# Patient Record
Sex: FEMALE | Race: WHITE | Employment: FULL TIME | URBAN - METROPOLITAN AREA
[De-identification: names, ages, dates, MRNs, and addresses within clinical notes are randomized per-mention and may not be internally consistent; named-entity substitution may affect disease eponyms.]

---

## 2023-09-12 ENCOUNTER — HOSPITAL ENCOUNTER (OUTPATIENT)
Age: 38
Discharge: HOME | End: 2023-09-12
Payer: COMMERCIAL

## 2023-09-12 LAB
ALBUMIN SERPL-MCNC: 4.4 G/DL (ref 3.5–5.2)
ALBUMIN SERPL-MCNC: 4.5 G/DL (ref 3.5–5.2)
ALBUMIN/GLOB SERPL: 1.3 {RATIO} (ref 1–2.5)
ALBUMIN/GLOB SERPL: 1.5 {RATIO} (ref 1–2.5)
ALP SERPL-CCNC: 99 U/L (ref 35–104)
ALP SERPL-CCNC: 99 U/L (ref 35–104)
ALT SERPL-CCNC: 67 U/L (ref 5–33)
ALT SERPL-CCNC: 68 U/L (ref 5–33)
ANION GAP SERPL CALCULATED.3IONS-SCNC: 13 MMOL/L (ref 9–17)
ANION GAP SERPL CALCULATED.3IONS-SCNC: 13 MMOL/L (ref 9–17)
AST SERPL-CCNC: 74 U/L
AST SERPL-CCNC: 75 U/L
BASOPHILS # BLD: 0.08 K/UL (ref 0–0.2)
BASOPHILS # BLD: 0.09 K/UL (ref 0–0.2)
BASOPHILS NFR BLD: 1 % (ref 0–2)
BASOPHILS NFR BLD: 1 % (ref 0–2)
BILIRUB SERPL-MCNC: 0.8 MG/DL (ref 0.3–1.2)
BILIRUB SERPL-MCNC: 0.8 MG/DL (ref 0.3–1.2)
BUN SERPL-MCNC: 7 MG/DL (ref 6–20)
BUN SERPL-MCNC: 7 MG/DL (ref 6–20)
CALCIUM SERPL-MCNC: 9.5 MG/DL (ref 8.6–10.4)
CALCIUM SERPL-MCNC: 9.6 MG/DL (ref 8.6–10.4)
CHLORIDE SERPL-SCNC: 100 MMOL/L (ref 98–107)
CHLORIDE SERPL-SCNC: 101 MMOL/L (ref 98–107)
CHOLEST SERPL-MCNC: 196 MG/DL
CHOLESTEROL/HDL RATIO: 2.5
CO2 SERPL-SCNC: 25 MMOL/L (ref 20–31)
CO2 SERPL-SCNC: 25 MMOL/L (ref 20–31)
CREAT SERPL-MCNC: 0.6 MG/DL (ref 0.5–0.9)
CREAT SERPL-MCNC: 0.6 MG/DL (ref 0.5–0.9)
CRP SERPL HS-MCNC: <3 MG/L (ref 0–5)
EOSINOPHIL # BLD: 0.28 K/UL (ref 0–0.44)
EOSINOPHIL # BLD: 0.29 K/UL (ref 0–0.44)
EOSINOPHILS RELATIVE PERCENT: 4 % (ref 1–4)
EOSINOPHILS RELATIVE PERCENT: 4 % (ref 1–4)
ERYTHROCYTE [DISTWIDTH] IN BLOOD BY AUTOMATED COUNT: 12 % (ref 11.8–14.4)
ERYTHROCYTE [DISTWIDTH] IN BLOOD BY AUTOMATED COUNT: 12.1 % (ref 11.8–14.4)
ERYTHROCYTE [SEDIMENTATION RATE] IN BLOOD BY PHOTOMETRIC METHOD: 5 MM/HR (ref 0–20)
GFR SERPL CREATININE-BSD FRML MDRD: >60 ML/MIN/1.73M2
GFR SERPL CREATININE-BSD FRML MDRD: >60 ML/MIN/1.73M2
GLUCOSE SERPL-MCNC: 80 MG/DL (ref 70–99)
GLUCOSE SERPL-MCNC: 82 MG/DL (ref 70–99)
HCT VFR BLD AUTO: 44.4 % (ref 36.3–47.1)
HCT VFR BLD AUTO: 44.7 % (ref 36.3–47.1)
HDLC SERPL-MCNC: 77 MG/DL
HGB BLD-MCNC: 14.8 G/DL (ref 11.9–15.1)
HGB BLD-MCNC: 15 G/DL (ref 11.9–15.1)
IMM GRANULOCYTES # BLD AUTO: 0.03 K/UL (ref 0–0.3)
IMM GRANULOCYTES # BLD AUTO: <0.03 K/UL (ref 0–0.3)
IMM GRANULOCYTES NFR BLD: 0 %
IMM GRANULOCYTES NFR BLD: 0 %
LDLC SERPL CALC-MCNC: 108 MG/DL (ref 0–130)
LYMPHOCYTES NFR BLD: 1.35 K/UL (ref 1.1–3.7)
LYMPHOCYTES NFR BLD: 1.41 K/UL (ref 1.1–3.7)
LYMPHOCYTES RELATIVE PERCENT: 17 % (ref 24–43)
LYMPHOCYTES RELATIVE PERCENT: 18 % (ref 24–43)
MCH RBC QN AUTO: 35.7 PG (ref 25.2–33.5)
MCH RBC QN AUTO: 36.3 PG (ref 25.2–33.5)
MCHC RBC AUTO-ENTMCNC: 33.1 G/DL (ref 28.4–34.8)
MCHC RBC AUTO-ENTMCNC: 33.8 G/DL (ref 28.4–34.8)
MCV RBC AUTO: 107.5 FL (ref 82.6–102.9)
MCV RBC AUTO: 108 FL (ref 82.6–102.9)
MONOCYTES NFR BLD: 0.87 K/UL (ref 0.1–1.2)
MONOCYTES NFR BLD: 0.9 K/UL (ref 0.1–1.2)
MONOCYTES NFR BLD: 11 % (ref 3–12)
MONOCYTES NFR BLD: 11 % (ref 3–12)
NEUTROPHILS NFR BLD: 66 % (ref 36–65)
NEUTROPHILS NFR BLD: 67 % (ref 36–65)
NEUTS SEG NFR BLD: 5.25 K/UL (ref 1.5–8.1)
NEUTS SEG NFR BLD: 5.32 K/UL (ref 1.5–8.1)
NRBC BLD-RTO: 0 PER 100 WBC
NRBC BLD-RTO: 0 PER 100 WBC
PLATELET # BLD AUTO: 388 K/UL (ref 138–453)
PLATELET # BLD AUTO: 402 K/UL (ref 138–453)
PMV BLD AUTO: 10.9 FL (ref 8.1–13.5)
PMV BLD AUTO: 10.9 FL (ref 8.1–13.5)
POTASSIUM SERPL-SCNC: 4.2 MMOL/L (ref 3.7–5.3)
POTASSIUM SERPL-SCNC: 4.2 MMOL/L (ref 3.7–5.3)
PROLACTIN SERPL-MCNC: 7 NG/ML (ref 4.79–23.3)
PROT SERPL-MCNC: 7.5 G/DL (ref 6.4–8.3)
PROT SERPL-MCNC: 7.8 G/DL (ref 6.4–8.3)
RBC # BLD AUTO: 4.13 M/UL (ref 3.95–5.11)
RBC # BLD AUTO: 4.14 M/UL (ref 3.95–5.11)
RBC # BLD: ABNORMAL 10*6/UL
RBC # BLD: ABNORMAL 10*6/UL
SODIUM SERPL-SCNC: 138 MMOL/L (ref 135–144)
SODIUM SERPL-SCNC: 139 MMOL/L (ref 135–144)
TRIGL SERPL-MCNC: 54 MG/DL
TSH SERPL DL<=0.05 MIU/L-ACNC: 2.34 UIU/ML (ref 0.3–5)
WBC OTHER # BLD: 7.9 K/UL (ref 3.5–11.3)
WBC OTHER # BLD: 8 K/UL (ref 3.5–11.3)

## 2023-09-12 PROCEDURE — 36415 COLL VENOUS BLD VENIPUNCTURE: CPT

## 2023-09-12 PROCEDURE — 80053 COMPREHEN METABOLIC PANEL: CPT

## 2023-09-12 PROCEDURE — 85652 RBC SED RATE AUTOMATED: CPT

## 2023-09-12 PROCEDURE — 83036 HEMOGLOBIN GLYCOSYLATED A1C: CPT

## 2023-09-12 PROCEDURE — 84443 ASSAY THYROID STIM HORMONE: CPT

## 2023-09-12 PROCEDURE — 86140 C-REACTIVE PROTEIN: CPT

## 2023-09-12 PROCEDURE — 84146 ASSAY OF PROLACTIN: CPT

## 2023-09-12 PROCEDURE — 82670 ASSAY OF TOTAL ESTRADIOL: CPT

## 2023-09-12 PROCEDURE — 82306 VITAMIN D 25 HYDROXY: CPT

## 2023-09-12 PROCEDURE — 80061 LIPID PANEL: CPT

## 2023-09-12 PROCEDURE — 85025 COMPLETE CBC W/AUTO DIFF WBC: CPT

## 2023-09-12 PROCEDURE — 83001 ASSAY OF GONADOTROPIN (FSH): CPT

## 2023-09-13 ENCOUNTER — HOSPITAL ENCOUNTER (OUTPATIENT)
Age: 38
Setting detail: SPECIMEN
Discharge: HOME | End: 2023-09-13
Payer: COMMERCIAL

## 2023-09-13 LAB
25(OH)D3 SERPL-MCNC: 42.9 NG/ML
EST. AVERAGE GLUCOSE BLD GHB EST-MCNC: 85 MG/DL
ESTRADIOL LEVEL: <5 PG/ML
FSH SERPL-ACNC: 23.8 MIU/ML
HBA1C MFR BLD: 4.6 % (ref 4–6)

## 2023-09-13 PROCEDURE — 83993 ASSAY FOR CALPROTECTIN FECAL: CPT

## 2023-09-16 LAB — CALPROTECTIN, FECAL: 2080 UG/G

## 2023-10-09 ENCOUNTER — HOSPITAL ENCOUNTER (OUTPATIENT)
Dept: MAMMOGRAPHY | Age: 38
Discharge: HOME | End: 2023-10-11

## 2023-10-09 ENCOUNTER — HOSPITAL ENCOUNTER (OUTPATIENT)
Dept: ULTRASOUND IMAGING | Age: 38
Discharge: HOME | End: 2023-10-11

## 2023-10-09 DIAGNOSIS — N63.11 UNSPECIFIED LUMP IN THE RIGHT BREAST, UPPER OUTER QUADRANT: ICD-10-CM

## 2024-10-31 ENCOUNTER — HOSPITAL ENCOUNTER (EMERGENCY)
Age: 39
Discharge: HOME | End: 2024-10-31
Attending: EMERGENCY MEDICINE
Payer: COMMERCIAL

## 2024-10-31 VITALS
OXYGEN SATURATION: 100 % | HEART RATE: 88 BPM | BODY MASS INDEX: 23.08 KG/M2 | SYSTOLIC BLOOD PRESSURE: 118 MMHG | TEMPERATURE: 98.4 F | DIASTOLIC BLOOD PRESSURE: 60 MMHG | WEIGHT: 107 LBS | HEIGHT: 57 IN | RESPIRATION RATE: 16 BRPM

## 2024-10-31 DIAGNOSIS — S61.211A LACERATION OF LEFT INDEX FINGER WITHOUT DAMAGE TO NAIL, FOREIGN BODY PRESENCE UNSPECIFIED, INITIAL ENCOUNTER: Primary | ICD-10-CM

## 2024-10-31 PROCEDURE — 6360000002 HC RX W HCPCS: Performed by: NURSE PRACTITIONER

## 2024-10-31 PROCEDURE — 90471 IMMUNIZATION ADMIN: CPT | Performed by: NURSE PRACTITIONER

## 2024-10-31 PROCEDURE — 12001 RPR S/N/AX/GEN/TRNK 2.5CM/<: CPT

## 2024-10-31 PROCEDURE — 99284 EMERGENCY DEPT VISIT MOD MDM: CPT

## 2024-10-31 PROCEDURE — 90715 TDAP VACCINE 7 YRS/> IM: CPT | Performed by: NURSE PRACTITIONER

## 2024-10-31 RX ORDER — ESCITALOPRAM OXALATE 10 MG/1
10 TABLET ORAL DAILY
COMMUNITY
Start: 2024-10-17

## 2024-10-31 RX ADMIN — TETANUS TOXOID, REDUCED DIPHTHERIA TOXOID AND ACELLULAR PERTUSSIS VACCINE, ADSORBED 0.5 ML: 5; 2.5; 8; 8; 2.5 SUSPENSION INTRAMUSCULAR at 12:11

## 2024-10-31 ASSESSMENT — PAIN SCALES - GENERAL: PAINLEVEL_OUTOF10: 4

## 2024-10-31 ASSESSMENT — ENCOUNTER SYMPTOMS
ABDOMINAL PAIN: 0
SORE THROAT: 0
COUGH: 0
VOMITING: 0
BACK PAIN: 0
DIARRHEA: 0
NAUSEA: 0
SHORTNESS OF BREATH: 0

## 2024-10-31 ASSESSMENT — LIFESTYLE VARIABLES
HOW OFTEN DO YOU HAVE A DRINK CONTAINING ALCOHOL: 2-3 TIMES A WEEK
HOW MANY STANDARD DRINKS CONTAINING ALCOHOL DO YOU HAVE ON A TYPICAL DAY: 1 OR 2

## 2024-10-31 ASSESSMENT — PAIN - FUNCTIONAL ASSESSMENT: PAIN_FUNCTIONAL_ASSESSMENT: 0-10

## 2024-10-31 NOTE — ED PROVIDER NOTES
[facility] Emergency Department  [address]  [facility] [address]  Phone: [phone]  Fax: [phone]      Attending Physician Attestation    Based on the medical record, the care appears appropriate. I was personally available for consultation in the Emergency Department. I did have a discussion with our midlevel provider regarding the care of this patient.  I reviewed the mid level provider's note and agree with the documented findings and plan of care.   I have reviewed the emergency nurses triage note. I agree with the chief complaint, past medical history, past surgical history, allergies, medications, social and family history as documented unless otherwise noted below.       CHIEF COMPLAINT       Chief Complaint   Patient presents with    Laceration     L index finger on the pad with a dermaplane razor, bleeding controlled, needs tetanus, no thinners         PAST MEDICAL HISTORY    has no past medical history on file.    SURGICAL HISTORY      has no past surgical history on file.    CURRENT MEDICATIONS       Previous Medications    ALBUTEROL (PROVENTIL;VENTOLIN) 2 MG/5ML SYRUP    Take 5 mLs by mouth 3 times daily    CETIRIZINE (ZYRTEC) 5 MG TABLET    Take 1 tablet by mouth daily    ESCITALOPRAM (LEXAPRO) 10 MG TABLET    Take 1 tablet by mouth daily    HYDROCODONE-ACETAMINOPHEN (NORCO) 5-325 MG PER TABLET    Take 1 tablet by mouth every 6 hours as needed for Pain. Max Daily Amount: 4 tablets    MESALAMINE (LIALDA) 1.2 G EC TABLET    Take 1 tablet by mouth daily (with breakfast)    PANTOPRAZOLE (PROTONIX) 20 MG TABLET    Take 1 tablet by mouth daily       ALLERGIES     is allergic to adhesive tape.    (Please note that portions of this note were completed with a voice recognition program.  Efforts were made to edit the dictations but occasionally words are mis-transcribed.)    Bean Vázquez DO, DO  Attending Emergency Physician        Bean Vázquez DO  10/31/24  [id number]

## 2024-10-31 NOTE — ED PROVIDER NOTES
[facility] EMERGENCY DEPARTMENT  EMERGENCY DEPARTMENT ENCOUNTER      Pt Name: Rand Cardenas  MRN: [id number]  Birthdate 1985  Date of evaluation: 10/31/2024  Provider: IKE Dempsey CNP  12:24 PM    CHIEF COMPLAINT       Chief Complaint   Patient presents with    Laceration     L index finger on the pad with a dermaplane razor, bleeding controlled, needs tetanus, no thinners         HISTORY OF PRESENT ILLNESS    Rand Cardenas is a 38 y.o. female who presents to the emergency department      This is a nontoxic-appearing 38-year-old female presenting to the emergency department with her  the patient reports approximately 1 hour prior to arrival with her micro derma blade she accidentally cut the tip of her left hand second digit, was concerned that it may need repair prompting her to come to the emergency department.  Patient reports that her tetanus immunization is not up-to-date, she has washed the wound and put a dressing over it.        The history is provided by the patient and medical records.       Nursing Notes were reviewed.    REVIEW OF SYSTEMS       Review of Systems   Constitutional:  Negative for chills, fatigue and fever.   HENT:  Negative for congestion and sore throat.    Respiratory:  Negative for cough and shortness of breath.    Cardiovascular:  Negative for chest pain and leg swelling.   Gastrointestinal:  Negative for abdominal pain, diarrhea, nausea and vomiting.   Genitourinary:  Negative for dysuria and hematuria.   Musculoskeletal:  Negative for back pain and neck pain.   Skin:  Negative for rash and wound.        Positive for left hand second distal digit skin laceration.   Neurological:  Negative for numbness and headaches.   Psychiatric/Behavioral:  Negative for confusion and suicidal ideas.        Except as noted above the remainder of the review of systems was reviewed and negative.       PAST MEDICAL HISTORY   History reviewed. No pertinent past  present.      Mental Status: She is alert and oriented to person, place, and time. Mental status is at baseline.      Gait: Gait normal.   Psychiatric:         Mood and Affect: Mood normal.         Behavior: Behavior normal.         DIAGNOSTIC RESULTS     EKG: All EKG's are interpreted by the Emergency Department Physician who either signs or Co-signs this chart in the absence of a cardiologist.        RADIOLOGY:   Non-plain film images such as CT, Ultrasound and MRI are read by the radiologist. Plain radiographic images are visualized and preliminarily interpreted by the emergency physician with the below findings:        Interpretation per the Radiologist below, if available at the time of this note:    No orders to display         ED BEDSIDE ULTRASOUND:   Performed by ED Physician - none    LABS:  Labs Reviewed - No data to display    All other labs were within normal range or not returned as of this dictation.    EMERGENCY DEPARTMENT COURSE and DIFFERENTIAL DIAGNOSIS/MDM:   Vitals:    Vitals:    10/31/24 1113   BP: 118/60   Pulse: 88   Resp: 16   Temp: 98.4 °F (36.9 °C)   TempSrc: Oral   SpO2: 100%   Weight: 48.5 kg (107 lb)   Height: 1.448 m (4' 9\")           Medical Decision Making  This is a nontoxic-appearing 38-year-old female presenting to the emergency department with her  the patient reports approximately 1 hour prior to arrival with her micro derma blade she accidentally cut the tip of her left hand second digit, was concerned that it may need repair prompting her to come to the emergency department.  Patient reports that her tetanus immunization is not up-to-date, she has washed the wound and put a dressing over it.    Patient's tetanus immunization was updated in the department.    Wound was soaked and cleansed and Shur-Clens, warm water.    Laceration was repaired without complication with skin adhesive patient tolerated this well, on exam there is no concerns for tendon involvement or damage,

## 2024-10-31 NOTE — DISCHARGE INSTRUCTIONS
Return to the ER: Fevers, redness warmth swelling to the finger, worsening pain, pale color or numbness, weakness, streaks of red going up the finger or hand or arm; or any other concerning symptoms.

## 2024-11-02 ENCOUNTER — HOSPITAL ENCOUNTER (EMERGENCY)
Age: 39
Discharge: HOME | End: 2024-11-02
Attending: EMERGENCY MEDICINE
Payer: COMMERCIAL

## 2024-11-02 ENCOUNTER — APPOINTMENT (OUTPATIENT)
Dept: CT IMAGING | Age: 39
End: 2024-11-02
Payer: COMMERCIAL

## 2024-11-02 VITALS
WEIGHT: 112 LBS | BODY MASS INDEX: 24.16 KG/M2 | OXYGEN SATURATION: 99 % | HEIGHT: 57 IN | SYSTOLIC BLOOD PRESSURE: 141 MMHG | RESPIRATION RATE: 20 BRPM | HEART RATE: 81 BPM | TEMPERATURE: 97.7 F | DIASTOLIC BLOOD PRESSURE: 76 MMHG

## 2024-11-02 DIAGNOSIS — R10.9 ABDOMINAL PAIN, UNSPECIFIED ABDOMINAL LOCATION: Primary | ICD-10-CM

## 2024-11-02 DIAGNOSIS — R11.2 NAUSEA AND VOMITING, UNSPECIFIED VOMITING TYPE: ICD-10-CM

## 2024-11-02 LAB
ALBUMIN SERPL-MCNC: 5 G/DL (ref 3.5–5.2)
ALBUMIN/GLOB SERPL: 1.3 {RATIO} (ref 1–2.5)
ALP SERPL-CCNC: 83 U/L (ref 35–104)
ALT SERPL-CCNC: 23 U/L (ref 5–33)
ANION GAP SERPL CALCULATED.3IONS-SCNC: 17 MMOL/L (ref 9–17)
AST SERPL-CCNC: 23 U/L
BASOPHILS # BLD: 0.1 K/UL (ref 0–0.2)
BASOPHILS NFR BLD: 1 % (ref 0–2)
BILIRUB SERPL-MCNC: 1.3 MG/DL (ref 0.3–1.2)
BUN SERPL-MCNC: 12 MG/DL (ref 6–20)
CALCIUM SERPL-MCNC: 10.4 MG/DL (ref 8.6–10.4)
CHLORIDE SERPL-SCNC: 97 MMOL/L (ref 98–107)
CO2 SERPL-SCNC: 21 MMOL/L (ref 20–31)
CREAT SERPL-MCNC: 0.5 MG/DL (ref 0.5–0.9)
EOSINOPHIL # BLD: 0 K/UL (ref 0–0.4)
EOSINOPHILS RELATIVE PERCENT: 0 % (ref 1–4)
ERYTHROCYTE [DISTWIDTH] IN BLOOD BY AUTOMATED COUNT: 12.2 % (ref 12.5–15.4)
GFR, ESTIMATED: >90 ML/MIN/1.73M2
GLUCOSE SERPL-MCNC: 99 MG/DL (ref 70–99)
HCG SERPL QL: NEGATIVE
HCT VFR BLD AUTO: 45.7 % (ref 36–46)
HGB BLD-MCNC: 15.6 G/DL (ref 12–16)
LACTATE BLDV-SCNC: 2.3 MMOL/L (ref 0.5–2.2)
LIPASE SERPL-CCNC: 17 U/L (ref 13–60)
LYMPHOCYTES NFR BLD: 1.1 K/UL (ref 1–4.8)
LYMPHOCYTES RELATIVE PERCENT: 9 % (ref 24–44)
MCH RBC QN AUTO: 34.4 PG (ref 26–34)
MCHC RBC AUTO-ENTMCNC: 34.1 G/DL (ref 31–37)
MCV RBC AUTO: 100.8 FL (ref 80–100)
MONOCYTES NFR BLD: 0.9 K/UL (ref 0.1–1.2)
MONOCYTES NFR BLD: 7 % (ref 2–11)
NEUTROPHILS NFR BLD: 83 % (ref 36–66)
NEUTS SEG NFR BLD: 10.2 K/UL (ref 1.8–7.7)
PLATELET # BLD AUTO: 493 K/UL (ref 140–450)
PMV BLD AUTO: 8.6 FL (ref 6–12)
POTASSIUM SERPL-SCNC: 4.3 MMOL/L (ref 3.7–5.3)
PROT SERPL-MCNC: 9 G/DL (ref 6.4–8.3)
RBC # BLD AUTO: 4.54 M/UL (ref 4–5.2)
SODIUM SERPL-SCNC: 135 MMOL/L (ref 135–144)
WBC OTHER # BLD: 12.2 K/UL (ref 3.5–11)

## 2024-11-02 PROCEDURE — 96372 THER/PROPH/DIAG INJ SC/IM: CPT | Performed by: EMERGENCY MEDICINE

## 2024-11-02 PROCEDURE — 83690 ASSAY OF LIPASE: CPT

## 2024-11-02 PROCEDURE — 80053 COMPREHEN METABOLIC PANEL: CPT

## 2024-11-02 PROCEDURE — 93005 ELECTROCARDIOGRAM TRACING: CPT | Performed by: NURSE PRACTITIONER

## 2024-11-02 PROCEDURE — 99285 EMERGENCY DEPT VISIT HI MDM: CPT | Performed by: EMERGENCY MEDICINE

## 2024-11-02 PROCEDURE — 96375 TX/PRO/DX INJ NEW DRUG ADDON: CPT | Performed by: EMERGENCY MEDICINE

## 2024-11-02 PROCEDURE — 2580000003 HC RX 258: Performed by: NURSE PRACTITIONER

## 2024-11-02 PROCEDURE — 85025 COMPLETE CBC W/AUTO DIFF WBC: CPT

## 2024-11-02 PROCEDURE — 84703 CHORIONIC GONADOTROPIN ASSAY: CPT

## 2024-11-02 PROCEDURE — 96361 HYDRATE IV INFUSION ADD-ON: CPT | Performed by: EMERGENCY MEDICINE

## 2024-11-02 PROCEDURE — 2500000003 HC RX 250 WO HCPCS: Performed by: NURSE PRACTITIONER

## 2024-11-02 PROCEDURE — 6360000004 HC RX CONTRAST MEDICATION: Performed by: EMERGENCY MEDICINE

## 2024-11-02 PROCEDURE — 83605 ASSAY OF LACTIC ACID: CPT

## 2024-11-02 PROCEDURE — 74177 CT ABD & PELVIS W/CONTRAST: CPT

## 2024-11-02 PROCEDURE — 96374 THER/PROPH/DIAG INJ IV PUSH: CPT | Performed by: EMERGENCY MEDICINE

## 2024-11-02 PROCEDURE — 6360000002 HC RX W HCPCS: Performed by: NURSE PRACTITIONER

## 2024-11-02 PROCEDURE — 2580000003 HC RX 258: Performed by: EMERGENCY MEDICINE

## 2024-11-02 RX ORDER — SODIUM CHLORIDE 0.9 % (FLUSH) 0.9 %
10 SYRINGE (ML) INJECTION
Status: COMPLETED | OUTPATIENT
Start: 2024-11-02 | End: 2024-11-02

## 2024-11-02 RX ORDER — DICYCLOMINE HYDROCHLORIDE 10 MG/ML
20 INJECTION INTRAMUSCULAR ONCE
Status: COMPLETED | OUTPATIENT
Start: 2024-11-02 | End: 2024-11-02

## 2024-11-02 RX ORDER — 0.9 % SODIUM CHLORIDE 0.9 %
80 INTRAVENOUS SOLUTION INTRAVENOUS ONCE
Status: DISCONTINUED | OUTPATIENT
Start: 2024-11-02 | End: 2024-11-03 | Stop reason: HOSPADM

## 2024-11-02 RX ORDER — 0.9 % SODIUM CHLORIDE 0.9 %
1000 INTRAVENOUS SOLUTION INTRAVENOUS ONCE
Status: COMPLETED | OUTPATIENT
Start: 2024-11-02 | End: 2024-11-02

## 2024-11-02 RX ORDER — IOPAMIDOL 755 MG/ML
75 INJECTION, SOLUTION INTRAVASCULAR
Status: COMPLETED | OUTPATIENT
Start: 2024-11-02 | End: 2024-11-02

## 2024-11-02 RX ORDER — DROPERIDOL 2.5 MG/ML
0.62 INJECTION, SOLUTION INTRAMUSCULAR; INTRAVENOUS ONCE
Status: COMPLETED | OUTPATIENT
Start: 2024-11-02 | End: 2024-11-02

## 2024-11-02 RX ORDER — ONDANSETRON 2 MG/ML
4 INJECTION INTRAMUSCULAR; INTRAVENOUS ONCE
Status: COMPLETED | OUTPATIENT
Start: 2024-11-02 | End: 2024-11-02

## 2024-11-02 RX ADMIN — Medication 80 ML: at 20:39

## 2024-11-02 RX ADMIN — SODIUM CHLORIDE, PRESERVATIVE FREE 10 ML: 5 INJECTION INTRAVENOUS at 20:39

## 2024-11-02 RX ADMIN — DICYCLOMINE HYDROCHLORIDE 20 MG: 10 INJECTION, SOLUTION INTRAMUSCULAR at 19:23

## 2024-11-02 RX ADMIN — ONDANSETRON 4 MG: 2 INJECTION INTRAMUSCULAR; INTRAVENOUS at 19:18

## 2024-11-02 RX ADMIN — SODIUM CHLORIDE 1000 ML: 9 INJECTION, SOLUTION INTRAVENOUS at 19:18

## 2024-11-02 RX ADMIN — FAMOTIDINE 20 MG: 10 INJECTION, SOLUTION INTRAVENOUS at 19:19

## 2024-11-02 RX ADMIN — DROPERIDOL 0.62 MG: 2.5 INJECTION, SOLUTION INTRAMUSCULAR; INTRAVENOUS at 20:54

## 2024-11-02 RX ADMIN — IOPAMIDOL 75 ML: 755 INJECTION, SOLUTION INTRAVENOUS at 20:39

## 2024-11-02 ASSESSMENT — LIFESTYLE VARIABLES
HOW MANY STANDARD DRINKS CONTAINING ALCOHOL DO YOU HAVE ON A TYPICAL DAY: PATIENT DOES NOT DRINK
HOW OFTEN DO YOU HAVE A DRINK CONTAINING ALCOHOL: NEVER

## 2024-11-02 ASSESSMENT — PAIN SCALES - GENERAL
PAINLEVEL_OUTOF10: 2
PAINLEVEL_OUTOF10: 4

## 2024-11-02 ASSESSMENT — PAIN - FUNCTIONAL ASSESSMENT: PAIN_FUNCTIONAL_ASSESSMENT: 0-10

## 2024-11-03 NOTE — ED PROVIDER NOTES
[facility] EMERGENCY DEPARTMENT  EMERGENCY DEPARTMENT ENCOUNTER      Pt Name: Rand Cardenas  MRN: [id number]  Birthdate 1985  Date of evaluation: 11/2/2024  Provider: IKE Villaseñor CNP  9:27 AM    CHIEF COMPLAINT       Chief Complaint   Patient presents with    Abdominal Pain    Vomiting         HISTORY OF PRESENT ILLNESS    Rand Cardenas is a 38 y.o. female who presents to the emergency department for evaluation of constant nausea and vomiting since taking her Ozempic shot last night at 10 PM.  Patient states that she has been on Ozempic for about 2 years for weight loss.  She had lost enough weight and stopped it about 5 weeks ago.  She started to gain some weight back and restarted the drug last evening at 10 PM.  She restarted at the same dose that she had adjusted did 2 previously 0.8 mL.  She states that the doctor told her that taking the highest dose would be fine even though she had been off of it for 5 weeks.  Since 10 PM last evening she has been constantly nauseous retching and dry heaving near constantly.  Anytime she tries to eat or drink anything it immediately comes back up.  She reports feeling as though there are spasms in the abdomen.  She really denies any pain no chest pain no shortness of breath    HPI    Nursing Notes were reviewed.    REVIEW OF SYSTEMS       Review of Systems   All other systems reviewed and are negative.      Except as noted above the remainder of the review of systems was reviewed and negative.       PAST MEDICAL HISTORY     Past Medical History:   Diagnosis Date    Anxiety     Asthma     Ulcerative colitis (HCC)          SURGICAL HISTORY       Past Surgical History:   Procedure Laterality Date    ABDOMINOPLASTY      BREAST ENHANCEMENT SURGERY      TUBAL LIGATION           CURRENT MEDICATIONS       Discharge Medication List as of 11/2/2024  9:40 PM        CONTINUE these medications which have NOT CHANGED    Details   escitalopram (LEXAPRO) 
out to her family doctor regarding this and to avoid reinitiating this medication until further evaluated by her PCP.       I estimate there is LOW risk for ACUTE APPENDICITIS, BOWEL OBSTRUCTION, CHOLECYSTITIS, DIVERTICULITIS, INCARCERATED HERNIA, PANCREATITIS, or PERFORATED BOWEL or ULCER, thus I consider the discharge disposition reasonable. Re-evaluation of the abdomen is benign. No guarding, peritoneal signs or significant tenderness noted.  Also, there is no evidence or peritonitis, sepsis, or toxicity. The patient and/or family and I have discussed the diagnosis and risks, and we agree with discharging home to follow-up with their primary doctor. The patient presents with abdominal pain without signs of peritonitis or other life-threatening or serious etiology. The patient appears stable for discharge and has been instructed to return immediately if the symptoms worsen in any way, or in 8-12 hr if not improved for re-evaluation.  We also discussed returning to the Emergency Department immediately if new or worsening symptoms occur. We have discussed the symptoms which are most concerning (e.g., bloody stool, fever, changing or worsening pain, persistent vomiting, chest pain shortness of breath, numbness or weakness to the arms or legs, coolness or color change to the arms or legs) that necessitate immediate return.     The patient understands that at this time there is no evidence for a more malignant underlying process, but the patient also understands that early in the process of an illness or injury, an emergency department workup can be falsely reassuring.  Routine discharge counseling was given, and the patient understands that worsening, changing or persistent symptoms should prompt an immediate call or follow up with their primary physician or return to the emergency department. The importance of appropriate follow up was also discussed.  I have reviewed the disposition diagnosis with the patient and or

## 2024-11-04 LAB
EKG ATRIAL RATE: 70 BPM
EKG P AXIS: 43 DEGREES
EKG P-R INTERVAL: 120 MS
EKG Q-T INTERVAL: 424 MS
EKG QRS DURATION: 78 MS
EKG QTC CALCULATION (BAZETT): 457 MS
EKG R AXIS: 83 DEGREES
EKG T AXIS: 50 DEGREES
EKG VENTRICULAR RATE: 70 BPM

## 2024-11-04 PROCEDURE — 93010 ELECTROCARDIOGRAM REPORT: CPT | Performed by: INTERNAL MEDICINE
